# Patient Record
Sex: FEMALE | Race: ASIAN | NOT HISPANIC OR LATINO | Employment: OTHER | URBAN - METROPOLITAN AREA
[De-identification: names, ages, dates, MRNs, and addresses within clinical notes are randomized per-mention and may not be internally consistent; named-entity substitution may affect disease eponyms.]

---

## 2020-01-17 ENCOUNTER — OFFICE VISIT (OUTPATIENT)
Dept: MEDICAL GROUP | Facility: PHYSICIAN GROUP | Age: 70
End: 2020-01-17
Payer: MEDICARE

## 2020-01-17 VITALS
BODY MASS INDEX: 26.58 KG/M2 | DIASTOLIC BLOOD PRESSURE: 92 MMHG | TEMPERATURE: 97.6 F | WEIGHT: 150 LBS | OXYGEN SATURATION: 94 % | HEIGHT: 63 IN | SYSTOLIC BLOOD PRESSURE: 164 MMHG | HEART RATE: 67 BPM

## 2020-01-17 DIAGNOSIS — Z76.89 ENCOUNTER TO ESTABLISH CARE: ICD-10-CM

## 2020-01-17 DIAGNOSIS — R73.09 ELEVATED HEMOGLOBIN A1C: ICD-10-CM

## 2020-01-17 DIAGNOSIS — H53.9 VISION ABNORMALITIES: ICD-10-CM

## 2020-01-17 DIAGNOSIS — Z23 NEED FOR VACCINATION: ICD-10-CM

## 2020-01-17 DIAGNOSIS — E55.9 VITAMIN D DEFICIENCY: ICD-10-CM

## 2020-01-17 DIAGNOSIS — R03.0 ELEVATED BP WITHOUT DIAGNOSIS OF HYPERTENSION: ICD-10-CM

## 2020-01-17 DIAGNOSIS — Z13.29 SCREENING FOR HYPOTHYROIDISM: ICD-10-CM

## 2020-01-17 DIAGNOSIS — J45.20 INTERMITTENT ASTHMA WITHOUT COMPLICATION, UNSPECIFIED ASTHMA SEVERITY: ICD-10-CM

## 2020-01-17 DIAGNOSIS — E78.2 MIXED HYPERLIPIDEMIA: ICD-10-CM

## 2020-01-17 PROCEDURE — 90662 IIV NO PRSV INCREASED AG IM: CPT | Performed by: NURSE PRACTITIONER

## 2020-01-17 PROCEDURE — 99204 OFFICE O/P NEW MOD 45 MIN: CPT | Mod: 25 | Performed by: NURSE PRACTITIONER

## 2020-01-17 PROCEDURE — G0008 ADMIN INFLUENZA VIRUS VAC: HCPCS | Performed by: NURSE PRACTITIONER

## 2020-01-17 RX ORDER — ALBUTEROL SULFATE 90 UG/1
AEROSOL, METERED RESPIRATORY (INHALATION)
COMMUNITY
Start: 2018-11-28 | End: 2020-07-17 | Stop reason: SDUPTHER

## 2020-01-17 RX ORDER — MONTELUKAST SODIUM 10 MG/1
TABLET ORAL
COMMUNITY
Start: 2017-06-13 | End: 2020-01-17 | Stop reason: SDUPTHER

## 2020-01-17 RX ORDER — MONTELUKAST SODIUM 10 MG/1
10 TABLET ORAL DAILY
Qty: 90 TAB | Refills: 1 | Status: SHIPPED | OUTPATIENT
Start: 2020-01-17 | End: 2020-06-30

## 2020-01-17 ASSESSMENT — PATIENT HEALTH QUESTIONNAIRE - PHQ9: CLINICAL INTERPRETATION OF PHQ2 SCORE: 0

## 2020-01-17 NOTE — PROGRESS NOTES
Chief Complaint   Patient presents with   • Establish Care     Establish care.       HISTORY OF PRESENT ILLNESS: Patient is a 70 y.o. female, new  patient who presents today to discuss medical problems as listed below:    Health Maintenance:  COMPLETED    Vision abnormalities  New to me.  New problem.  Patient reports cloudy and mild burning in both eyes x 2 months.  She denies pain, halos, floaters, no decrease in vision, no headaches.  No blurred vision, sensitivity to the light, no itching or discharge. She wears glasses, no recent evaluation with ophthalmologist.  She states her allergies have been exacerbated since she moved to [facility] in August since August of 2019.  She admits to runny eyes, she uses over-the-counter eyedrops for dry eye which have not been helping much.     Elevated BP without diagnosis of hypertension  New to me.  BP today in the office is 164/92.  Patient reports being a little anxious with new provider in the medical office, in addition to the use of her inhaler in the last week.  Patient states her blood pressure is usually in 130s over 80s and no prior diagnosis of hypertension.  She denies headaches, dizziness, chest pain, dyspnea, cardiac palpitations, swelling in lower extremities, no nausea.    Intermittent asthma without complication  New to me.  Chronic problem, initially diagnosed at the age of 14.  States well-controlled on medications, albuterol inhaler and Advair, also taking montelukast daily for allergy control.  She would like a refill on montelukast 10 Advair today.  Patient states in the last week increase the use of albuterol due to allergy symptom exacerbation, runny nose, and intermittent shortness of breath, states triggers are cold weather.  Patient moved to [facility] to [facility] 4 months ago, this is her first winter here.  She denies cough, wheezing, CP, dyspnea, palpitations, headaches or dizziness.  Her BP today is high today  164/82 her pulses 67, pulse ox is 84%.  Recent spirometry testing done the last year per patient.      Patient Active Problem List    Diagnosis Date Noted   • Elevated BP without diagnosis of hypertension 2020   • Encounter to establish care 2020   • Intermittent asthma without complication 2020   • Vision abnormalities 2020        Allergies: Patient has no known allergies.    Current Outpatient Medications   Medication Sig Dispense Refill   • albuterol (VENTOLIN HFA) 108 (90 Base) MCG/ACT Aero Soln inhalation aerosol Use 2 puffs by inhalation 4 times a day Rinse mouthpiece at least weekly with warm water. SHAKE WELL BEFORE USING(Wait 1 minute between inhalations).Use albuterol inhaler first,wait 10 minutes before using anti-inflammatory (like Qvar or FLovent)     • montelukast (SINGULAIR) 10 MG Tab Take 1 Tab by mouth every day. 90 Tab 1   • fluticasone-salmeterol (ADVAIR DISKUS) 250-50 MCG/DOSE AEROSOL POWDER, BREATH ACTIVATED Inhale 1 Puff by mouth every 12 hours. 1 Inhaler 3     No current facility-administered medications for this visit.        Social History     Tobacco Use   • Smoking status: Former Smoker     Packs/day: 0.25     Years: 8.00     Pack years: 2.00     Last attempt to quit: 1981     Years since quittin.0   • Smokeless tobacco: Never Used   Substance Use Topics   • Alcohol use: Yes     Comment: occasionally   • Drug use: Not on file     Social History     Patient does not qualify to have social determinant information on file (likely too young).   Social History Narrative   • Not on file       Family History   Problem Relation Age of Onset   • Cancer Father 62        pancrease ca       Allergies, past medical history, past surgical history, family history, social history reviewed and updated.    Review of Systems:      - Constitutional: Negative for fever, chills, unexpected weight change, and fatigue/generalized weakness.     - HEENT: Eyes feel cloudy with mild  "burning, runny eyes.  Negative for headaches, hearing changes, ear pain, ear discharge, rhinorrhea, sinus congestion, sore throat, and neck pain.      - Respiratory: Negative for cough, sputum production, chest congestion, dyspnea, wheezing, and crackles.      - Cardiovascular: Negative for chest pain, palpitations, orthopnea, and bilateral lower extremity edema.     - Gastrointestinal:Negative for heartburn, nausea, vomiting, abdominal pain, hematochezia, melena, diarrhea, constipation, and greasy/foul-smelling stools.     - Genitourinary: Negative for dysuria, polyuria, hematuria, pyuria, urinary urgency, and urinary incontinence.    - Musculoskeletal: Negative for myalgias, back pain, and joint pain.     - Skin: Negative for rash, itching, cyanotic skin color change.     - Neurological: Negative for dizziness, tingling, tremors, focal sensory deficit, focal weakness and headaches.     - Endo/Heme/Allergies: Does not bruise/bleed easily.     - Psychiatric/Behavioral: Negative for depression, suicidal/homicidal ideation and memory loss.      All other systems reviewed and are negative    Exam:    BP (!) 164/92   Pulse 67   Temp 36.4 °C (97.6 °F)   Ht 1.6 m (5' 3\")   Wt 68 kg (150 lb)   SpO2 94%   BMI 26.57 kg/m²  Body mass index is 26.57 kg/m².    Physical Exam:  Constitutional: Well-developed and well-nourished. Not diaphoretic. No distress.   Skin: Skin is warm and dry. No rash noted.  Head: Atraumatic without lesions.  Eyes: Conjunctivae with mild erythema.  Extraocular motions are normal. Pupils are equal, round, and reactive to light. No scleral icterus.   Ears:  External ears unremarkable. Tympanic membranes clear and intact.  Nose: Nares patent. Septum midline. Turbinates without erythema nor edema. No discharge.   Mouth/Throat: Dentition is normal. Tongue normal. Oropharynx is clear and moist. Posterior pharynx without erythema or exudates.  Neck: Supple, trachea midline. Normal range of motion. No " thyromegaly present. No lymphadenopathy--cervical or supraclavicular.  Cardiovascular: Regular rate and rhythm, S1 and S2 without murmur, rubs, or gallops.    Chest: Effort normal. Clear to auscultation throughout. No adventitious sounds. No CVA tenderness.  Abdomen: Soft, non tender, and without distention. Active bowel sounds in all four quadrants. No rebound, guarding, masses or HSM.  : Negative for dysuria, polyuria, hematuria, pyuria, urinary urgency, and urinary incontinence.  Extremities: No cyanosis, clubbing, erythema, nor edema. Distal pulses intact and symmetric.   Musculoskeletal: All major joints AROM full in all directions without pain.  Neurological: Alert and oriented x 3. DTRs 2+/3 and symmetric. No cranial nerve deficit. 5/5 myotomes. Sensation intact. Negative Rhomberg.  Psychiatric:  Behavior, mood, and affect are appropriate.    LABS: 6/25/19  results reviewed and discussed with the patient, questions answered.    Assessment/Plan:  1. Elevated BP without diagnosis of hypertension  First occurrence.  Discussed etiology of high blood pressure.  Possibly associated with whitecoat syndrome and the use of steroid inhaler..  Patient given BP log to start monitoring consistent BP twice daily in a.m. and p.m., obtain BP cuff of the upper arm (no wrist device).  Will evaluate BP next visit.  If experiencing persistent headaches, chest pain, shortness of breath, palpitations, dizziness go to ED.  - CBC WITH DIFFERENTIAL; Future  - Comp Metabolic Panel; Future    2. Encounter to establish care    3. Intermittent asthma without complication, unspecified asthma severity  Stable.  Continue to use current medications, refills given.  - montelukast (SINGULAIR) 10 MG Tab; Take 1 Tab by mouth every day.  Dispense: 90 Tab; Refill: 1  - fluticasone-salmeterol (ADVAIR DISKUS) 250-50 MCG/DOSE AEROSOL POWDER, BREATH ACTIVATED; Inhale 1 Puff by mouth every 12 hours.  Dispense: 1 Inhaler; Refill: 3  - PULMONARY  FUNCTION TESTS -Test requested: Complete Pulmonary Function Test; Future    4. Vision abnormalities  Uncontrolled.  Patient to follow-up with ophthalmology.  If symptoms worsen, experience pain or burning in the eyes, changes in vision, halos around light go to ED.  - REFERRAL TO OPHTHALMOLOGY    5. Elevated hemoglobin A1c  - Comp Metabolic Panel; Future  - HEMOGLOBIN A1C; Future    6. Vitamin D deficiency  - VITAMIN D,25 HYDROXY; Future    7. Screening for hypothyroidism  - FREE THYROXINE; Future  - TSH; Future    8. Mixed hyperlipidemia  - Lipid Profile; Future    9. Need for vaccination  - Influenza Vaccine, High Dose (65+ Only)    Discussed with patient possible alternative diagnoses, pt is to take all medications as prescribed. If symptoms persist FU w/PCP, if symptoms worsen go to emergency room. If experiencing any side effects from prescribed medications reports to the office immediately or go to emergency room.  Reviewed indication, dosage, usage and potential adverse effects of prescribed medications. Reviewed risks and benefits of treatment plan. Patient verbalizes understanding of all instruction and verbally agrees to plan.    Return in about 1 week (around 1/24/2020).

## 2020-01-17 NOTE — ASSESSMENT & PLAN NOTE
New to me.  Chronic problem, initially diagnosed at the age of 14.  States well-controlled on medications, albuterol inhaler and Advair, also taking montelukast daily for allergy control.  She would like a refill on montelukast 10 Advair today.  Patient states in the last week increase the use of albuterol due to allergy symptom exacerbation, runny nose, and intermittent shortness of breath, states triggers are cold weather.  Patient moved to [facility] to [facility] 4 months ago, this is her first winter here.  She denies cough, wheezing, CP, dyspnea, palpitations, headaches or dizziness.  Her BP today is high today 164/82 her pulses 67, pulse ox is 84%.  Recent spirometry testing done the last year per patient.

## 2020-01-17 NOTE — ASSESSMENT & PLAN NOTE
New to me.  BP today in the office is 164/92.  Patient reports being a little anxious with new provider in the medical office, in addition to the use of her inhaler in the last week.  Patient states her blood pressure is usually in 130s over 80s and no prior diagnosis of hypertension.  She denies headaches, dizziness, chest pain, dyspnea, cardiac palpitations, swelling in lower extremities, no nausea.

## 2020-01-17 NOTE — ASSESSMENT & PLAN NOTE
New to me.  New problem.  Patient reports cloudy and mild burning in both eyes x 2 months.  She denies pain, halos, floaters, no decrease in vision, no headaches.  No blurred vision, sensitivity to the light, no itching or discharge. She wears glasses, no recent evaluation with ophthalmologist.  She states her allergies have been exacerbated since she moved to [facility] from [facility] in August since August of 2019.  She admits to runny eyes, she uses over-the-counter eyedrops for dry eye which have not been helping much.

## 2020-01-20 ENCOUNTER — HOSPITAL ENCOUNTER (OUTPATIENT)
Dept: LAB | Facility: MEDICAL CENTER | Age: 70
End: 2020-01-20
Attending: NURSE PRACTITIONER
Payer: MEDICARE

## 2020-01-20 DIAGNOSIS — Z13.29 SCREENING FOR HYPOTHYROIDISM: ICD-10-CM

## 2020-01-20 DIAGNOSIS — R73.09 ELEVATED HEMOGLOBIN A1C: ICD-10-CM

## 2020-01-20 DIAGNOSIS — E78.2 MIXED HYPERLIPIDEMIA: ICD-10-CM

## 2020-01-20 DIAGNOSIS — R03.0 ELEVATED BP WITHOUT DIAGNOSIS OF HYPERTENSION: ICD-10-CM

## 2020-01-20 DIAGNOSIS — E55.9 VITAMIN D DEFICIENCY: ICD-10-CM

## 2020-01-20 LAB
25(OH)D3 SERPL-MCNC: 21 NG/ML (ref 30–100)
ALBUMIN SERPL BCP-MCNC: 4.6 G/DL (ref 3.2–4.9)
ALBUMIN/GLOB SERPL: 1.3 G/DL
ALP SERPL-CCNC: 92 U/L (ref 30–99)
ALT SERPL-CCNC: 25 U/L (ref 2–50)
ANION GAP SERPL CALC-SCNC: 11 MMOL/L (ref 0–11.9)
AST SERPL-CCNC: 35 U/L (ref 12–45)
BASOPHILS # BLD AUTO: 0.6 % (ref 0–1.8)
BASOPHILS # BLD: 0.03 K/UL (ref 0–0.12)
BILIRUB SERPL-MCNC: 0.6 MG/DL (ref 0.1–1.5)
BUN SERPL-MCNC: 14 MG/DL (ref 8–22)
CALCIUM SERPL-MCNC: 10.1 MG/DL (ref 8.5–10.5)
CHLORIDE SERPL-SCNC: 104 MMOL/L (ref 96–112)
CHOLEST SERPL-MCNC: 276 MG/DL (ref 100–199)
CO2 SERPL-SCNC: 26 MMOL/L (ref 20–33)
CREAT SERPL-MCNC: 0.55 MG/DL (ref 0.5–1.4)
EOSINOPHIL # BLD AUTO: 0.26 K/UL (ref 0–0.51)
EOSINOPHIL NFR BLD: 5.3 % (ref 0–6.9)
ERYTHROCYTE [DISTWIDTH] IN BLOOD BY AUTOMATED COUNT: 49.4 FL (ref 35.9–50)
EST. AVERAGE GLUCOSE BLD GHB EST-MCNC: 111 MG/DL
GLOBULIN SER CALC-MCNC: 3.5 G/DL (ref 1.9–3.5)
GLUCOSE SERPL-MCNC: 97 MG/DL (ref 65–99)
HBA1C MFR BLD: 5.5 % (ref 0–5.6)
HCT VFR BLD AUTO: 49.3 % (ref 37–47)
HDLC SERPL-MCNC: 81 MG/DL
HGB BLD-MCNC: 15.5 G/DL (ref 12–16)
IMM GRANULOCYTES # BLD AUTO: 0.02 K/UL (ref 0–0.11)
IMM GRANULOCYTES NFR BLD AUTO: 0.4 % (ref 0–0.9)
LDLC SERPL CALC-MCNC: 127 MG/DL
LYMPHOCYTES # BLD AUTO: 1.36 K/UL (ref 1–4.8)
LYMPHOCYTES NFR BLD: 27.6 % (ref 22–41)
MCH RBC QN AUTO: 33 PG (ref 27–33)
MCHC RBC AUTO-ENTMCNC: 31.4 G/DL (ref 33.6–35)
MCV RBC AUTO: 104.9 FL (ref 81.4–97.8)
MONOCYTES # BLD AUTO: 0.49 K/UL (ref 0–0.85)
MONOCYTES NFR BLD AUTO: 9.9 % (ref 0–13.4)
NEUTROPHILS # BLD AUTO: 2.77 K/UL (ref 2–7.15)
NEUTROPHILS NFR BLD: 56.2 % (ref 44–72)
NRBC # BLD AUTO: 0 K/UL
NRBC BLD-RTO: 0 /100 WBC
PLATELET # BLD AUTO: 251 K/UL (ref 164–446)
PMV BLD AUTO: 9.9 FL (ref 9–12.9)
POTASSIUM SERPL-SCNC: 4.2 MMOL/L (ref 3.6–5.5)
PROT SERPL-MCNC: 8.1 G/DL (ref 6–8.2)
RBC # BLD AUTO: 4.7 M/UL (ref 4.2–5.4)
SODIUM SERPL-SCNC: 141 MMOL/L (ref 135–145)
T4 FREE SERPL-MCNC: 0.82 NG/DL (ref 0.53–1.43)
TRIGL SERPL-MCNC: 342 MG/DL (ref 0–149)
TSH SERPL DL<=0.005 MIU/L-ACNC: 2.15 UIU/ML (ref 0.38–5.33)
WBC # BLD AUTO: 4.9 K/UL (ref 4.8–10.8)

## 2020-01-20 PROCEDURE — 83036 HEMOGLOBIN GLYCOSYLATED A1C: CPT | Mod: GA

## 2020-01-20 PROCEDURE — 84443 ASSAY THYROID STIM HORMONE: CPT

## 2020-01-20 PROCEDURE — 36415 COLL VENOUS BLD VENIPUNCTURE: CPT | Mod: GA

## 2020-01-20 PROCEDURE — 80053 COMPREHEN METABOLIC PANEL: CPT

## 2020-01-20 PROCEDURE — 80061 LIPID PANEL: CPT

## 2020-01-20 PROCEDURE — 84439 ASSAY OF FREE THYROXINE: CPT

## 2020-01-20 PROCEDURE — 82306 VITAMIN D 25 HYDROXY: CPT

## 2020-01-20 PROCEDURE — 85025 COMPLETE CBC W/AUTO DIFF WBC: CPT

## 2020-01-24 ENCOUNTER — OFFICE VISIT (OUTPATIENT)
Dept: MEDICAL GROUP | Facility: PHYSICIAN GROUP | Age: 70
End: 2020-01-24
Payer: MEDICARE

## 2020-01-24 VITALS
BODY MASS INDEX: 26.4 KG/M2 | HEART RATE: 78 BPM | TEMPERATURE: 97.5 F | OXYGEN SATURATION: 94 % | RESPIRATION RATE: 16 BRPM | HEIGHT: 63 IN | DIASTOLIC BLOOD PRESSURE: 70 MMHG | WEIGHT: 149 LBS | SYSTOLIC BLOOD PRESSURE: 152 MMHG

## 2020-01-24 DIAGNOSIS — I15.9 SECONDARY HYPERTENSION: ICD-10-CM

## 2020-01-24 DIAGNOSIS — E55.9 VITAMIN D DEFICIENCY: ICD-10-CM

## 2020-01-24 DIAGNOSIS — R03.0 ELEVATED BP WITHOUT DIAGNOSIS OF HYPERTENSION: ICD-10-CM

## 2020-01-24 DIAGNOSIS — E78.2 MIXED HYPERLIPIDEMIA: ICD-10-CM

## 2020-01-24 PROCEDURE — 99214 OFFICE O/P EST MOD 30 MIN: CPT | Performed by: NURSE PRACTITIONER

## 2020-01-24 RX ORDER — LISINOPRIL 5 MG/1
5 TABLET ORAL DAILY
Qty: 30 TAB | Refills: 1 | Status: SHIPPED | OUTPATIENT
Start: 2020-01-24 | End: 2020-02-21

## 2020-01-24 RX ORDER — ROSUVASTATIN CALCIUM 10 MG/1
10 TABLET, COATED ORAL EVERY EVENING
Qty: 30 TAB | Refills: 2 | Status: SHIPPED | OUTPATIENT
Start: 2020-01-24 | End: 2020-02-21 | Stop reason: SDUPTHER

## 2020-01-24 RX ORDER — MONTELUKAST SODIUM 10 MG/1
TABLET ORAL
COMMUNITY
Start: 2020-01-17 | End: 2020-07-31

## 2020-01-24 RX ORDER — ERGOCALCIFEROL 1.25 MG/1
50000 CAPSULE ORAL
Qty: 12 CAP | Refills: 1 | Status: SHIPPED | OUTPATIENT
Start: 2020-01-24 | End: 2020-06-23

## 2020-01-25 NOTE — ASSESSMENT & PLAN NOTE
New diagnosis.  BP today is 152/70, previous value was 164/92.  Patient brought BP log and consistent data for the last 5 days, with a baseline of 140s and 150s over 70s and 80s.  Patient denies headaches, dizziness, CP, palpitations, dyspnea, lower extremity swelling.

## 2020-01-25 NOTE — ASSESSMENT & PLAN NOTE
Patient denies CP, dyspnea, palpitations, lower extremity swelling.  No abdominal discomfort abdominal distention.   Ref. Range 1/20/2020 10:04   Cholesterol,Tot Latest Ref Range: 100 - 199 mg/dL 276 (H)   Triglycerides Latest Ref Range: 0 - 149 mg/dL 342 (H)   HDL Latest Ref Range: >=40 mg/dL 81   LDL Latest Ref Range: <100 mg/dL 127 (H)   The 10-year ASCVD risk score (Dansvilledemond DURANT Jr., et al., 2013) is: 13.3%    Values used to calculate the score:      Age: 70 years      Sex: Female      Is Non- : No      Diabetic: No      Tobacco smoker: No      Systolic Blood Pressure: 152 mmHg      Is BP treated: No      HDL Cholesterol: 81 mg/dL      Total Cholesterol: 276 mg/dL

## 2020-01-25 NOTE — PROGRESS NOTES
Chief Complaint   Patient presents with   • Follow-Up     BP & test results       HISTORY OF PRESENT ILLNESS: Patient is a 70 y.o. female, established patient who presents today to discuss medical problems as listed below:    Mixed hyperlipidemia  Patient denies CP, dyspnea, palpitations, lower extremity swelling.  No abdominal discomfort abdominal distention.   Ref. Range 1/20/2020 10:04   Cholesterol,Tot Latest Ref Range: 100 - 199 mg/dL 276 (H)   Triglycerides Latest Ref Range: 0 - 149 mg/dL 342 (H)   HDL Latest Ref Range: >=40 mg/dL 81   LDL Latest Ref Range: <100 mg/dL 127 (H)   The 10-year ASCVD risk score (Jairon DURANT Jr., et al., 2013) is: 13.3%    Values used to calculate the score:      Age: 70 years      Sex: Female      Is Non- : No      Diabetic: No      Tobacco smoker: No      Systolic Blood Pressure: 152 mmHg      Is BP treated: No      HDL Cholesterol: 81 mg/dL      Total Cholesterol: 276 mg/dL    Vitamin D deficiency  Vitamin D is 21, patient is on a supplement.      Patient Active Problem List    Diagnosis Date Noted   • Mixed hyperlipidemia 01/24/2020   • Vitamin D deficiency 01/24/2020   • Encounter to establish care 01/17/2020   • Intermittent asthma without complication 01/17/2020   • Vision abnormalities 01/17/2020        Allergies: Patient has no known allergies.    Current Outpatient Medications   Medication Sig Dispense Refill   • WIXELA INHUB 250-50 MCG/DOSE AEROSOL POWDER, BREATH ACTIVATED      • vitamin D, Ergocalciferol, (DRISDOL) 37730 units Cap capsule Take 1 Cap by mouth every 7 days. 12 Cap 1   • rosuvastatin (CRESTOR) 10 MG Tab Take 1 Tab by mouth every evening. 30 Tab 2   • lisinopril (PRINIVIL) 5 MG Tab Take 1 Tab by mouth every day. 30 Tab 1   • albuterol (VENTOLIN HFA) 108 (90 Base) MCG/ACT Aero Soln inhalation aerosol Use 2 puffs by inhalation 4 times a day Rinse mouthpiece at least weekly with warm water. SHAKE WELL BEFORE USING(Wait 1 minute between  "inhalations).Use albuterol inhaler first,wait 10 minutes before using anti-inflammatory (like Qvar or FLovent)     • montelukast (SINGULAIR) 10 MG Tab Take 1 Tab by mouth every day. 90 Tab 1   • fluticasone-salmeterol (ADVAIR DISKUS) 250-50 MCG/DOSE AEROSOL POWDER, BREATH ACTIVATED Inhale 1 Puff by mouth every 12 hours. 1 Inhaler 3   • montelukast (SINGULAIR) 10 MG Tab        No current facility-administered medications for this visit.        Social History     Tobacco Use   • Smoking status: Former Smoker     Packs/day: 0.25     Years: 8.00     Pack years: 2.00     Last attempt to quit: 1981     Years since quittin.0   • Smokeless tobacco: Never Used   Substance Use Topics   • Alcohol use: Yes     Comment: occasionally   • Drug use: Not on file     Social History     Patient does not qualify to have social determinant information on file (likely too young).   Social History Narrative   • Not on file       Family History   Problem Relation Age of Onset   • Cancer Father 62        pancrease ca       Allergies, past medical history, past surgical history, family history, social history reviewed and updated.    Review of Systems:      - Constitutional: Negative for fever, chills, unexpected weight change, and fatigue/generalized weakness.    - Respiratory: Negative for cough, sputum production, chest congestion, dyspnea, wheezing, and crackles.      - Cardiovascular: Negative for chest pain, palpitations, orthopnea, and bilateral lower extremity edema.     - Psychiatric/Behavioral: Negative for depression, suicidal/homicidal ideation and memory loss.      All other systems reviewed and are negative    Exam:    /70 (BP Location: Right arm, Patient Position: Sitting, BP Cuff Size: Adult)   Pulse 78   Temp 36.4 °C (97.5 °F) (Temporal)   Resp 16   Ht 1.6 m (5' 3\")   Wt 67.6 kg (149 lb)   SpO2 94%   BMI 26.39 kg/m²  Body mass index is 26.39 kg/m².    Physical Exam:  Constitutional: Well-developed and " well-nourished. Not diaphoretic. No distress.   Cardiovascular: Regular rate and rhythm, S1 and S2 without murmur, rubs, or gallops.    Chest: Effort normal. Clear to auscultation throughout. No adventitious sounds.   Neurological: Alert and oriented x 3.   Psychiatric:  Behavior, mood, and affect are appropriate.    LABS: 1/20/19  results reviewed and discussed with the patient, questions answered.    Patient was seen for 25 minutes face to face of which > 50% of appointment time was spent on counseling and coordination of care regarding the above.     Assessment/Plan:  1. Vitamin D deficiency  - vitamin D, Ergocalciferol, (DRISDOL) 27097 units Cap capsule; Take 1 Cap by mouth every 7 days.  Dispense: 12 Cap; Refill: 1    2. Mixed hyperlipidemia  Uncontrolled.  Discussed etiology and risk factors associated with high cholesterol.  Discussed healthy lifestyle such as exercise, healthy nutrition, avoiding sweet and fried foods.  Okay to take OTC fish oil if able to tolerate given history of cholecystectomy.  Encourage stress control.  Discussed possible risk factors associated with statins.  Advised to increase daily fiber intake, nutritional choices discussed.  - rosuvastatin (CRESTOR) 10 MG Tab; Take 1 Tab by mouth every evening.  Dispense: 30 Tab; Refill: 2  - Lipid Profile; Future  - Comp Metabolic Panel; Future    3. Elevated BP without diagnosis of hypertension  Uncontrolled.  Discussed the etiology and risk factors associated with of uncontrolled BP and uncontrolled cholesterol.  Patient started on lisinopril 5 mg daily.  Discussed possible side effects of the medication such as cough, lower extremity swelling, angioedema.  Stop taking if experiencing side effects and contact the office.  - lisinopril (PRINIVIL) 5 MG Tab; Take 1 Tab by mouth every day.  Dispense: 30 Tab; Refill: 1    Discussed with patient possible alternative diagnoses, pt is to take all medications as prescribed. If symptoms persist FU  w/PCP, if symptoms worsen go to emergency room. If experiencing any side effects from prescribed medications reports to the office immediately or go to emergency room.  Reviewed indication, dosage, usage and potential adverse effects of prescribed medications. Reviewed risks and benefits of treatment plan. Patient verbalizes understanding of all instruction and verbally agrees to plan.    Return if symptoms worsen or fail to improve.

## 2020-02-21 ENCOUNTER — OFFICE VISIT (OUTPATIENT)
Dept: MEDICAL GROUP | Facility: PHYSICIAN GROUP | Age: 70
End: 2020-02-21
Payer: MEDICARE

## 2020-02-21 VITALS
HEART RATE: 97 BPM | HEIGHT: 63 IN | WEIGHT: 150.7 LBS | OXYGEN SATURATION: 95 % | TEMPERATURE: 97.8 F | DIASTOLIC BLOOD PRESSURE: 70 MMHG | RESPIRATION RATE: 16 BRPM | BODY MASS INDEX: 26.7 KG/M2 | SYSTOLIC BLOOD PRESSURE: 130 MMHG

## 2020-02-21 DIAGNOSIS — I10 ESSENTIAL HYPERTENSION: ICD-10-CM

## 2020-02-21 DIAGNOSIS — E78.2 MIXED HYPERLIPIDEMIA: ICD-10-CM

## 2020-02-21 PROCEDURE — 99213 OFFICE O/P EST LOW 20 MIN: CPT | Performed by: NURSE PRACTITIONER

## 2020-02-21 RX ORDER — CHLORAL HYDRATE 500 MG
1000 CAPSULE ORAL 2 TIMES DAILY
COMMUNITY

## 2020-02-21 RX ORDER — OLMESARTAN MEDOXOMIL 5 MG/1
5 TABLET ORAL DAILY
Qty: 60 TAB | Refills: 1 | Status: SHIPPED | OUTPATIENT
Start: 2020-02-21 | End: 2020-05-28

## 2020-02-21 RX ORDER — ROSUVASTATIN CALCIUM 10 MG/1
10 TABLET, COATED ORAL EVERY EVENING
Qty: 60 TAB | Refills: 1 | Status: SHIPPED | OUTPATIENT
Start: 2020-02-21 | End: 2020-05-28

## 2020-02-21 NOTE — PROGRESS NOTES
Chief Complaint   Patient presents with   • Hypertension Follow-up     pt states that the lisinopril makes her cough       HISTORY OF PRESENT ILLNESS: Patient is a 70 y.o. female, established patient who presents today to discuss medical problems as listed below:    Essential hypertension  Known issue.  Patient started on lisinopril 5 mg last visit.  Today patient reports cough with no associated symptoms of URI, most likely from initiation of lisinopril.  Last dose was this morning.  Patient BP today is 130/70.  She denies CP, dyspnea, palpitations, headaches, dizziness.  Patient forgot to bring BP log today.    Mixed hyperlipidemia  Patient is taking rosuvastatin 10 mg, started last visit.  She denies side effects, no muscle pain, no GI issues.  She would like a refill today.  She would be leaving town for a month and a half and would like a refill until then.      Patient Active Problem List    Diagnosis Date Noted   • Essential hypertension 02/21/2020   • Mixed hyperlipidemia 01/24/2020   • Vitamin D deficiency 01/24/2020   • Encounter to establish care 01/17/2020   • Intermittent asthma without complication 01/17/2020   • Vision abnormalities 01/17/2020        Allergies: Lisinopril    Current Outpatient Medications   Medication Sig Dispense Refill   • Omega-3 Fatty Acids (FISH OIL) 1000 MG Cap capsule Take 1,000 mg by mouth 2 Times a Day.     • olmesartan (BENICAR) 5 MG tablet Take 1 Tab by mouth every day. 60 Tab 1   • rosuvastatin (CRESTOR) 10 MG Tab Take 1 Tab by mouth every evening. 60 Tab 1   • WIXELA INHUB 250-50 MCG/DOSE AEROSOL POWDER, BREATH ACTIVATED      • vitamin D, Ergocalciferol, (DRISDOL) 28920 units Cap capsule Take 1 Cap by mouth every 7 days. 12 Cap 1   • albuterol (VENTOLIN HFA) 108 (90 Base) MCG/ACT Aero Soln inhalation aerosol Use 2 puffs by inhalation 4 times a day Rinse mouthpiece at least weekly with warm water. SHAKE WELL BEFORE USING(Wait 1 minute between inhalations).Use albuterol  "inhaler first,wait 10 minutes before using anti-inflammatory (like Qvar or FLovent)     • montelukast (SINGULAIR) 10 MG Tab Take 1 Tab by mouth every day. 90 Tab 1   • fluticasone-salmeterol (ADVAIR DISKUS) 250-50 MCG/DOSE AEROSOL POWDER, BREATH ACTIVATED Inhale 1 Puff by mouth every 12 hours. 1 Inhaler 3   • montelukast (SINGULAIR) 10 MG Tab        No current facility-administered medications for this visit.        Social History     Tobacco Use   • Smoking status: Former Smoker     Packs/day: 0.25     Years: 8.00     Pack years: 2.00     Last attempt to quit: 1981     Years since quittin.1   • Smokeless tobacco: Never Used   Substance Use Topics   • Alcohol use: Yes     Comment: occasionally   • Drug use: Not on file     Social History     Social History Narrative   • Not on file       Family History   Problem Relation Age of Onset   • Cancer Father 62        pancrease ca       Allergies, past medical history, past surgical history, family history, social history reviewed and updated.    Review of Systems:     - Constitutional: Negative for fever, chills, unexpected weight change, and fatigue/generalized weakness.     - Respiratory: Negative for cough, sputum production, chest congestion, dyspnea, wheezing, and crackles.      - Cardiovascular: Negative for chest pain, palpitations, orthopnea, and bilateral lower extremity edema.     - Psychiatric/Behavioral: Negative for depression, suicidal/homicidal ideation and memory loss.      All other systems reviewed and are negative    Exam:    /70 (BP Location: Right arm, Patient Position: Sitting, BP Cuff Size: Adult)   Pulse 97   Temp 36.6 °C (97.8 °F) (Temporal)   Resp 16   Ht 1.6 m (5' 3\")   Wt 68.4 kg (150 lb 11.2 oz)   SpO2 95%   BMI 26.70 kg/m²  Body mass index is 26.7 kg/m².    Physical Exam:  Constitutional: Well-developed and well-nourished. Not diaphoretic. No distress.   Cardiovascular: Regular rate and rhythm, S1 and S2 without murmur, " rubs, or gallops.    Chest: Effort normal. Clear to auscultation throughout. No adventitious sounds.   Neurological: Alert and oriented x 3.   Psychiatric:  Behavior, mood, and affect are appropriate.    Assessment/Plan:  1. Essential hypertension  Well-controlled.  Will D/C lisinopril due to side effect of cough, lisinopril is listed under allergies.  Will start on olmesartan 5 mg daily.  Patient to start BP log and bring next visit.    2. Mixed hyperlipidemia  Continue to take current medications, refill given.  Will check lipid profile, lab orders in place, patient is aware.  Will review next visit.  Also reviewed healthy lifestyle today again.  - rosuvastatin (CRESTOR) 10 MG Tab; Take 1 Tab by mouth every evening.  Dispense: 60 Tab; Refill: 1    Discussed with patient possible alternative diagnoses, pt is to take all medications as prescribed. If symptoms persist FU w/PCP, if symptoms worsen go to emergency room. If experiencing any side effects from prescribed medications reports to the office immediately or go to emergency room.  Reviewed indication, dosage, usage and potential adverse effects of prescribed medications. Reviewed risks and benefits of treatment plan. Patient verbalizes understanding of all instruction and verbally agrees to plan.    Return if symptoms worsen or fail to improve.

## 2020-02-21 NOTE — ASSESSMENT & PLAN NOTE
Patient is taking rosuvastatin 10 mg, started last visit.  She denies side effects, no muscle pain, no GI issues.  She would like a refill today.  She would be leaving town for a month and a half and would like a refill until then.

## 2020-02-21 NOTE — ASSESSMENT & PLAN NOTE
Known issue.  Patient started on lisinopril 5 mg last visit.  Today patient reports cough with no associated symptoms of URI, most likely from initiation of lisinopril.  Last dose was this morning.  Patient BP today is 130/70.  She denies CP, dyspnea, palpitations, headaches, dizziness.  Patient forgot to bring BP log today.

## 2020-06-21 DIAGNOSIS — E55.9 VITAMIN D DEFICIENCY: ICD-10-CM

## 2020-06-23 RX ORDER — ERGOCALCIFEROL 1.25 MG/1
CAPSULE ORAL
Qty: 12 CAP | Refills: 1 | Status: SHIPPED | OUTPATIENT
Start: 2020-06-23 | End: 2020-12-15

## 2020-06-27 DIAGNOSIS — J45.20 INTERMITTENT ASTHMA WITHOUT COMPLICATION, UNSPECIFIED ASTHMA SEVERITY: ICD-10-CM

## 2020-06-29 NOTE — TELEPHONE ENCOUNTER
Received request via: Patient    Was the patient seen in the last year in this department? Yes    Does the patient have an active prescription (recently filled or refills available) for medication(s) requested? No

## 2020-06-30 RX ORDER — MONTELUKAST SODIUM 10 MG/1
TABLET ORAL
Qty: 90 TAB | Refills: 1 | Status: SHIPPED | OUTPATIENT
Start: 2020-06-30 | End: 2020-12-29

## 2020-07-01 ENCOUNTER — HOSPITAL ENCOUNTER (OUTPATIENT)
Dept: LAB | Facility: MEDICAL CENTER | Age: 70
End: 2020-07-01
Attending: NURSE PRACTITIONER
Payer: MEDICARE

## 2020-07-01 DIAGNOSIS — E78.2 MIXED HYPERLIPIDEMIA: ICD-10-CM

## 2020-07-01 LAB
ALBUMIN SERPL BCP-MCNC: 4.6 G/DL (ref 3.2–4.9)
ALBUMIN/GLOB SERPL: 1.6 G/DL
ALP SERPL-CCNC: 111 U/L (ref 30–99)
ALT SERPL-CCNC: 89 U/L (ref 2–50)
ANION GAP SERPL CALC-SCNC: 15 MMOL/L (ref 7–16)
AST SERPL-CCNC: 59 U/L (ref 12–45)
BILIRUB SERPL-MCNC: 0.5 MG/DL (ref 0.1–1.5)
BUN SERPL-MCNC: 19 MG/DL (ref 8–22)
CALCIUM SERPL-MCNC: 9.9 MG/DL (ref 8.5–10.5)
CHLORIDE SERPL-SCNC: 104 MMOL/L (ref 96–112)
CHOLEST SERPL-MCNC: 192 MG/DL (ref 100–199)
CO2 SERPL-SCNC: 22 MMOL/L (ref 20–33)
CREAT SERPL-MCNC: 0.52 MG/DL (ref 0.5–1.4)
FASTING STATUS PATIENT QL REPORTED: NORMAL
GLOBULIN SER CALC-MCNC: 2.8 G/DL (ref 1.9–3.5)
GLUCOSE SERPL-MCNC: 98 MG/DL (ref 65–99)
HDLC SERPL-MCNC: 96 MG/DL
LDLC SERPL CALC-MCNC: 69 MG/DL
POTASSIUM SERPL-SCNC: 4.1 MMOL/L (ref 3.6–5.5)
PROT SERPL-MCNC: 7.4 G/DL (ref 6–8.2)
SODIUM SERPL-SCNC: 141 MMOL/L (ref 135–145)
TRIGL SERPL-MCNC: 133 MG/DL (ref 0–149)

## 2020-07-01 PROCEDURE — 36415 COLL VENOUS BLD VENIPUNCTURE: CPT

## 2020-07-01 PROCEDURE — 80053 COMPREHEN METABOLIC PANEL: CPT

## 2020-07-01 PROCEDURE — 80061 LIPID PANEL: CPT

## 2020-07-17 ENCOUNTER — OFFICE VISIT (OUTPATIENT)
Dept: MEDICAL GROUP | Facility: PHYSICIAN GROUP | Age: 70
End: 2020-07-17
Payer: MEDICARE

## 2020-07-17 VITALS
TEMPERATURE: 98.2 F | HEART RATE: 87 BPM | HEIGHT: 62 IN | RESPIRATION RATE: 20 BRPM | DIASTOLIC BLOOD PRESSURE: 80 MMHG | OXYGEN SATURATION: 95 % | WEIGHT: 153.4 LBS | SYSTOLIC BLOOD PRESSURE: 140 MMHG | BODY MASS INDEX: 28.23 KG/M2

## 2020-07-17 DIAGNOSIS — N95.1 MENOPAUSAL STATE: ICD-10-CM

## 2020-07-17 DIAGNOSIS — L30.9 ECZEMA, UNSPECIFIED TYPE: ICD-10-CM

## 2020-07-17 DIAGNOSIS — J45.909 MILD ASTHMA WITHOUT COMPLICATION, UNSPECIFIED WHETHER PERSISTENT: ICD-10-CM

## 2020-07-17 DIAGNOSIS — R79.89 ELEVATED LFTS: ICD-10-CM

## 2020-07-17 DIAGNOSIS — Z12.11 SCREENING FOR COLORECTAL CANCER: ICD-10-CM

## 2020-07-17 DIAGNOSIS — Z12.12 SCREENING FOR COLORECTAL CANCER: ICD-10-CM

## 2020-07-17 DIAGNOSIS — Z78.0 POSTMENOPAUSAL STATUS (AGE-RELATED) (NATURAL): ICD-10-CM

## 2020-07-17 DIAGNOSIS — E78.2 MIXED HYPERLIPIDEMIA: ICD-10-CM

## 2020-07-17 PROCEDURE — 99214 OFFICE O/P EST MOD 30 MIN: CPT | Performed by: NURSE PRACTITIONER

## 2020-07-17 RX ORDER — ALBUTEROL SULFATE 90 UG/1
2 AEROSOL, METERED RESPIRATORY (INHALATION) EVERY 6 HOURS PRN
Qty: 8.5 G | Refills: 3 | Status: SHIPPED | OUTPATIENT
Start: 2020-07-17 | End: 2020-07-21

## 2020-07-17 ASSESSMENT — FIBROSIS 4 INDEX: FIB4 SCORE: 1.74

## 2020-07-17 NOTE — ASSESSMENT & PLAN NOTE
No ETOH, no NSAIDS. Started on Crestor a few mos ago. Denies nausea, abdominal discomfort, no muscle ache.   Ref. Range 7/1/2020 10:36   AST(SGOT) Latest Ref Range: 12 - 45 U/L 59 (H)   ALT(SGPT) Latest Ref Range: 2 - 50 U/L 89 (H)   Alkaline Phosphatase Latest Ref Range: 30 - 99 U/L 111 (H)

## 2020-07-17 NOTE — PROGRESS NOTES
Chief Complaint   Patient presents with   • Follow-Up     Lab Review        HISTORY OF PRESENT ILLNESS: Patient is a 70 y.o. female, established patient who presents today to discuss medical problems as listed below:    Health Maintenance:  COMPLETED     Mixed hyperlipidemia  Chol panel WNL on Crestor 10 mg. Taking fish oil and trying healthy lifestyle. Denies CP, dyspnea, dizziness.   Ref. Range 7/1/2020 10:36   Cholesterol,Tot Latest Ref Range: 100 - 199 mg/dL 192   Triglycerides Latest Ref Range: 0 - 149 mg/dL 133   HDL Latest Ref Range: >=40 mg/dL 96   LDL Latest Ref Range: <100 mg/dL 69   The 10-year ASCVD risk score (Jairon DURANT Jr., et al., 2013) is: 13.3%    Values used to calculate the score:      Age: 70 years      Sex: Female      Is Non- : No      Diabetic: No      Tobacco smoker: No      Systolic Blood Pressure: 140 mmHg      Is BP treated: Yes      HDL Cholesterol: 96 mg/dL      Total Cholesterol: 192 mg/dL    Elevated LFTs  No ETOH, no NSAIDS. Started on Crestor a few mos ago. Denies nausea, abdominal discomfort, no muscle ache.   Ref. Range 7/1/2020 10:36   AST(SGOT) Latest Ref Range: 12 - 45 U/L 59 (H)   ALT(SGPT) Latest Ref Range: 2 - 50 U/L 89 (H)   Alkaline Phosphatase Latest Ref Range: 30 - 99 U/L 111 (H)       Eczema  Chronic issue. Well controlled on Mycolog cream, requesting refills today.  No recent outbreaks.    Mild asthma without complication  Chronic problem. Well controlled. No recent exacerbations. Requesting refills today.      Patient Active Problem List    Diagnosis Date Noted   • Elevated LFTs 07/17/2020   • Eczema 07/17/2020   • Mild asthma without complication 07/17/2020   • Essential hypertension 02/21/2020   • Mixed hyperlipidemia 01/24/2020   • Vitamin D deficiency 01/24/2020   • Encounter to establish care 01/17/2020   • Intermittent asthma without complication 01/17/2020   • Vision abnormalities 01/17/2020        Allergies: Lisinopril    Current  Outpatient Medications   Medication Sig Dispense Refill   • nystatin/triamcinolone (MYCOLOG) 551587-2.1 UNIT/GM-% Cream As 1 g 5   • albuterol (VENTOLIN HFA) 108 (90 Base) MCG/ACT Aero Soln inhalation aerosol Inhale 2 Puffs by mouth every 6 hours as needed for Shortness of Breath. 8.5 g 3   • montelukast (SINGULAIR) 10 MG Tab TAKE 1 TABLET BY MOUTH EVERY DAY 90 Tab 1   • vitamin D, Ergocalciferol, (DRISDOL) 1.25 MG (28369 UT) Cap capsule TAKE 1 CAPSULE BY MOUTH EVERY 7 DAYS 12 Cap 1   • olmesartan (BENICAR) 5 MG tablet TAKE 1 TABLET BY MOUTH EVERY DAY 60 Tab 1   • rosuvastatin (CRESTOR) 10 MG Tab TAKE 1 TABLET BY MOUTH EVERY EVENING 60 Tab 0   • Omega-3 Fatty Acids (FISH OIL) 1000 MG Cap capsule Take 1,000 mg by mouth 2 Times a Day.     • WIXELA INHUB 250-50 MCG/DOSE AEROSOL POWDER, BREATH ACTIVATED      • montelukast (SINGULAIR) 10 MG Tab      • fluticasone-salmeterol (ADVAIR DISKUS) 250-50 MCG/DOSE AEROSOL POWDER, BREATH ACTIVATED Inhale 1 Puff by mouth every 12 hours. (Patient not taking: Reported on 2020) 1 Inhaler 3     No current facility-administered medications for this visit.        Social History     Tobacco Use   • Smoking status: Former Smoker     Packs/day: 0.25     Years: 8.00     Pack years: 2.00     Last attempt to quit: 1981     Years since quittin.5   • Smokeless tobacco: Never Used   Substance Use Topics   • Alcohol use: Yes     Comment: occasionally   • Drug use: Never     Social History     Social History Narrative   • Not on file       Family History   Problem Relation Age of Onset   • Cancer Father 62        pancrease ca       Allergies, past medical history, past surgical history, family history, social history reviewed and updated.    Review of Systems:     - Constitutional: Negative for fever, chills, unexpected weight change, and fatigue/generalized weakness.    - Respiratory: Negative for cough, sputum production, chest congestion, dyspnea, wheezing, and crackles.      -  "Cardiovascular: Negative for chest pain, palpitations, orthopnea, and bilateral lower extremity edema.     - Psychiatric/Behavioral: Negative for depression, suicidal/homicidal ideation and memory loss.      All other systems reviewed and are negative    Exam:    /80 (BP Location: Right arm, Patient Position: Sitting, BP Cuff Size: Adult)   Pulse 87   Temp 36.8 °C (98.2 °F) (Temporal)   Resp 20   Ht 1.575 m (5' 2\")   Wt 69.6 kg (153 lb 6.4 oz)   SpO2 95%   BMI 28.06 kg/m²  Body mass index is 28.06 kg/m².    Physical Exam:  Constitutional: Well-developed and well-nourished. Not diaphoretic. No distress.   Cardiovascular: Regular rate and rhythm, S1 and S2 without murmur, rubs, or gallops.    Chest: Effort normal. Clear to auscultation throughout. No adventitious sounds.   Neurological: Alert and oriented x 3.  Psychiatric:  Behavior, mood, and affect are appropriate.  MA/nursing note and vitals reviewed.    LABS: 7/1/20  results reviewed and discussed with the patient, questions answered.    Patient was seen for 25 minutes face to face of which > 50% of appointment time was spent on counseling and coordination of care regarding the above.     Assessment/Plan:  1. Screening for colorectal cancer  - REFERRAL TO GI FOR COLONOSCOPY    2. Postmenopausal status (age-related) (natural)  - DS-BONE DENSITY STUDY (DEXA)    3. Menopausal state  - DS-BONE DENSITY STUDY (DEXA)    4. Eczema, unspecified type  Well controlled on current regimen, refill given.  - nystatin/triamcinolone (MYCOLOG) 705479-5.1 UNIT/GM-% Cream; As  Dispense: 1 g; Refill: 5    5. Mild asthma without complication, unspecified whether persistent  Well controled on current regimen. Refills given.  - albuterol (VENTOLIN HFA) 108 (90 Base) MCG/ACT Aero Soln inhalation aerosol; Inhale 2 Puffs by mouth every 6 hours as needed for Shortness of Breath.  Dispense: 8.5 g; Refill: 3    6. Mixed hyperlipidemia  Well controlled on current regimen, however " elevated LFTs most likely d/t statin. Will hold statin for x 1 wk and restart  Half dose of 5 mg. Will recheck labs, efficacy in 3 mos. Continue healthy life style, OTC fish oil and adequate daily fiber.  - Lipid Profile; Future  - Comp Metabolic Panel; Future    7. Elevated LFTs  Elevated, most likely d/t statin use. Discussed in #6  - Comp Metabolic Panel; Future       Discussed with patient possible alternative diagnoses, pt is to take all medications as prescribed. If symptoms persist FU w/PCP, if symptoms worsen go to emergency room. If experiencing any side effects from prescribed medications reports to the office immediately or go to emergency room.  Reviewed indication, dosage, usage and potential adverse effects of prescribed medications. Reviewed risks and benefits of treatment plan. Patient verbalizes understanding of all instruction and verbally agrees to plan.    Return if symptoms worsen or fail to improve.

## 2020-07-17 NOTE — ASSESSMENT & PLAN NOTE
Chol panel WNL on Crestor 10 mg. Taking fish oil and trying healthy lifestyle. Denies CP, dyspnea, dizziness.   Ref. Range 7/1/2020 10:36   Cholesterol,Tot Latest Ref Range: 100 - 199 mg/dL 192   Triglycerides Latest Ref Range: 0 - 149 mg/dL 133   HDL Latest Ref Range: >=40 mg/dL 96   LDL Latest Ref Range: <100 mg/dL 69   The 10-year ASCVD risk score (Jairon DURANT Jr., et al., 2013) is: 13.3%    Values used to calculate the score:      Age: 70 years      Sex: Female      Is Non- : No      Diabetic: No      Tobacco smoker: No      Systolic Blood Pressure: 140 mmHg      Is BP treated: Yes      HDL Cholesterol: 96 mg/dL      Total Cholesterol: 192 mg/dL

## 2020-07-21 RX ORDER — ALBUTEROL SULFATE 90 UG/1
2 AEROSOL, METERED RESPIRATORY (INHALATION) EVERY 6 HOURS PRN
Qty: 25.5 G | Refills: 2 | Status: SHIPPED | OUTPATIENT
Start: 2020-07-21 | End: 2021-06-14

## 2020-07-31 ENCOUNTER — OFFICE VISIT (OUTPATIENT)
Dept: MEDICAL GROUP | Facility: PHYSICIAN GROUP | Age: 70
End: 2020-07-31
Payer: MEDICARE

## 2020-07-31 VITALS
HEART RATE: 80 BPM | HEIGHT: 62 IN | OXYGEN SATURATION: 96 % | RESPIRATION RATE: 16 BRPM | DIASTOLIC BLOOD PRESSURE: 80 MMHG | BODY MASS INDEX: 28.3 KG/M2 | TEMPERATURE: 98.2 F | SYSTOLIC BLOOD PRESSURE: 136 MMHG | WEIGHT: 153.8 LBS

## 2020-07-31 DIAGNOSIS — E78.2 MIXED HYPERLIPIDEMIA: ICD-10-CM

## 2020-07-31 DIAGNOSIS — I10 ESSENTIAL HYPERTENSION: ICD-10-CM

## 2020-07-31 PROCEDURE — 99214 OFFICE O/P EST MOD 30 MIN: CPT | Performed by: NURSE PRACTITIONER

## 2020-07-31 RX ORDER — OLMESARTAN MEDOXOMIL 5 MG/1
5 TABLET ORAL
Qty: 90 TAB | Refills: 1 | Status: SHIPPED | OUTPATIENT
Start: 2020-07-31 | End: 2021-02-02

## 2020-07-31 ASSESSMENT — FIBROSIS 4 INDEX: FIB4 SCORE: 1.74

## 2020-07-31 NOTE — PROGRESS NOTES
Chief Complaint   Patient presents with   • Follow-Up     hypertension       HISTORY OF PRESENT ILLNESS: Patient is a 70 y.o. female, established patient who presents today to discuss medical problems as listed below:    Health Maintenance:  COMPLETED     Essential hypertension  Chronic problem. Well controlled on Olmesartan 5 mg daily, tolerating well. BP today is 136/80. She denies CP, dyspnea, palpitations, changes in vision, peripheral edema. Her home devise did not calibrate to match the office device.    Mixed hyperlipidemia  Chronic problem.  Was started on rosuvastatin 10 mg last visit.  This significantly improved her cholesterol, however patient experienced myalgia.  She temporarily stopped medication, she continuing healthy lifestyle and taking fish oil.  She denies CP, dyspnea, dizziness, peripheral edema.  We will plan to recheck cholesterol panel and assess the need for statin.      Patient Active Problem List    Diagnosis Date Noted   • Elevated LFTs 07/17/2020   • Eczema 07/17/2020   • Mild asthma without complication 07/17/2020   • Essential hypertension 02/21/2020   • Mixed hyperlipidemia 01/24/2020   • Vitamin D deficiency 01/24/2020   • Encounter to establish care 01/17/2020   • Intermittent asthma without complication 01/17/2020   • Vision abnormalities 01/17/2020        Allergies: Lisinopril    Current Outpatient Medications   Medication Sig Dispense Refill   • olmesartan (BENICAR) 5 MG tablet Take 1 Tab by mouth every day. 90 Tab 1   • albuterol 108 (90 Base) MCG/ACT Aero Soln inhalation aerosol INHALE 2 PUFFS BY MOUTH EVERY 6 HOURS AS NEEDED FOR SHORTNESS OF BREATH 25.5 g 2   • nystatin/triamcinolone (MYCOLOG) 113640-7.1 UNIT/GM-% Cream As 1 g 5   • montelukast (SINGULAIR) 10 MG Tab TAKE 1 TABLET BY MOUTH EVERY DAY 90 Tab 1   • vitamin D, Ergocalciferol, (DRISDOL) 1.25 MG (88996 UT) Cap capsule TAKE 1 CAPSULE BY MOUTH EVERY 7 DAYS 12 Cap 1   • rosuvastatin (CRESTOR) 10 MG Tab TAKE 1 TABLET BY  "MOUTH EVERY EVENING (Patient taking differently: 5 mg.) 60 Tab 0   • Omega-3 Fatty Acids (FISH OIL) 1000 MG Cap capsule Take 1,000 mg by mouth 2 Times a Day.     • WIXELA INHUB 250-50 MCG/DOSE AEROSOL POWDER, BREATH ACTIVATED      • fluticasone-salmeterol (ADVAIR DISKUS) 250-50 MCG/DOSE AEROSOL POWDER, BREATH ACTIVATED Inhale 1 Puff by mouth every 12 hours. (Patient not taking: Reported on 2020) 1 Inhaler 3     No current facility-administered medications for this visit.        Social History     Tobacco Use   • Smoking status: Former Smoker     Packs/day: 0.25     Years: 8.00     Pack years: 2.00     Last attempt to quit: 1981     Years since quittin.5   • Smokeless tobacco: Never Used   Substance Use Topics   • Alcohol use: Yes     Comment: occasionally   • Drug use: Never     Social History     Social History Narrative   • Not on file       Family History   Problem Relation Age of Onset   • Cancer Father 62        pancrease ca       Allergies, past medical history, past surgical history, family history, social history reviewed and updated.    Review of Systems:     - Constitutional: Negative for fever, chills, unexpected weight change, and fatigue/generalized weakness.     - Respiratory: Negative for cough, sputum production, chest congestion, dyspnea, wheezing, and crackles.      - Cardiovascular: Negative for chest pain, palpitations, orthopnea, and bilateral lower extremity edema.       - Psychiatric/Behavioral: Negative for depression, suicidal/homicidal ideation and memory loss.      All other systems reviewed and are negative    Exam:    /80 (BP Location: Left arm, Patient Position: Sitting)   Pulse 80   Temp 36.8 °C (98.2 °F) (Tympanic)   Resp 16   Ht 1.575 m (5' 2\")   Wt 69.8 kg (153 lb 12.8 oz)   SpO2 96%   BMI 28.13 kg/m²  Body mass index is 28.13 kg/m².    Physical Exam:  Constitutional: Well-developed and well-nourished. Not diaphoretic. No distress.   Cardiovascular: " Regular rate and rhythm, S1 and S2 without murmur, rubs, or gallops.    Chest: Effort normal. Clear to auscultation throughout. No adventitious sounds.   Neurological: Alert and oriented x 3.   Psychiatric:  Behavior, mood, and affect are appropriate.  MA/nursing note and vitals reviewed.    LABS: 7/1/20  results reviewed and discussed with the patient, questions answered.    Patient was seen for 25 minutes face to face of which > 50% of appointment time was spent on counseling and coordination of care regarding the above.     Assessment/Plan:  1. Essential hypertension  Controlled on current regimen, continue.  Refills given.  - olmesartan (BENICAR) 5 MG tablet; Take 1 Tab by mouth every day.  Dispense: 90 Tab; Refill: 1    2. Mixed hyperlipidemia  Stable.  Will hold off on statin at this time.  Continue healthy lifestyle, continue fish oil OTC, will recheck lipid panel in 3 months.       Discussed with patient possible alternative diagnoses, pt is to take all medications as prescribed. If symptoms persist FU w/PCP, if symptoms worsen go to emergency room. If experiencing any side effects from prescribed medications reports to the office immediately or go to emergency room.  Reviewed indication, dosage, usage and potential adverse effects of prescribed medications. Reviewed risks and benefits of treatment plan. Patient verbalizes understanding of all instruction and verbally agrees to plan.    Return if symptoms worsen or fail to improve.

## 2020-07-31 NOTE — ASSESSMENT & PLAN NOTE
Chronic problem. Well controlled on Olmesartan 5 mg daily, tolerating well. BP today is 136/80. She denies CP, dyspnea, palpitations, changes in vision, peripheral edema. Her home devise did not calibrate to match the office device.

## 2020-07-31 NOTE — ASSESSMENT & PLAN NOTE
Chronic problem.  Was started on rosuvastatin 10 mg last visit.  This significantly improved her cholesterol, however patient experienced myalgia.  She temporarily stopped medication, she continuing healthy lifestyle and taking fish oil.  She denies CP, dyspnea, dizziness, peripheral edema.  We will plan to recheck cholesterol panel and assess the need for statin.

## 2020-10-19 ENCOUNTER — HOSPITAL ENCOUNTER (OUTPATIENT)
Dept: LAB | Facility: MEDICAL CENTER | Age: 70
End: 2020-10-19
Attending: NURSE PRACTITIONER
Payer: MEDICARE

## 2020-10-19 DIAGNOSIS — E78.2 MIXED HYPERLIPIDEMIA: ICD-10-CM

## 2020-10-19 DIAGNOSIS — R79.89 ELEVATED LFTS: ICD-10-CM

## 2020-10-19 LAB
ALBUMIN SERPL BCP-MCNC: 4.5 G/DL (ref 3.2–4.9)
ALBUMIN/GLOB SERPL: 1.4 G/DL
ALP SERPL-CCNC: 75 U/L (ref 30–99)
ALT SERPL-CCNC: 51 U/L (ref 2–50)
ANION GAP SERPL CALC-SCNC: 14 MMOL/L (ref 7–16)
AST SERPL-CCNC: 56 U/L (ref 12–45)
BILIRUB SERPL-MCNC: 0.5 MG/DL (ref 0.1–1.5)
BUN SERPL-MCNC: 22 MG/DL (ref 8–22)
CALCIUM SERPL-MCNC: 10.3 MG/DL (ref 8.5–10.5)
CHLORIDE SERPL-SCNC: 100 MMOL/L (ref 96–112)
CHOLEST SERPL-MCNC: 265 MG/DL (ref 100–199)
CO2 SERPL-SCNC: 27 MMOL/L (ref 20–33)
CREAT SERPL-MCNC: 0.49 MG/DL (ref 0.5–1.4)
FASTING STATUS PATIENT QL REPORTED: NORMAL
GLOBULIN SER CALC-MCNC: 3.3 G/DL (ref 1.9–3.5)
GLUCOSE SERPL-MCNC: 86 MG/DL (ref 65–99)
HDLC SERPL-MCNC: 81 MG/DL
LDLC SERPL CALC-MCNC: 135 MG/DL
POTASSIUM SERPL-SCNC: 5.1 MMOL/L (ref 3.6–5.5)
PROT SERPL-MCNC: 7.8 G/DL (ref 6–8.2)
SODIUM SERPL-SCNC: 141 MMOL/L (ref 135–145)
TRIGL SERPL-MCNC: 246 MG/DL (ref 0–149)

## 2020-10-19 PROCEDURE — 80061 LIPID PANEL: CPT

## 2020-10-19 PROCEDURE — 80053 COMPREHEN METABOLIC PANEL: CPT

## 2020-10-19 PROCEDURE — 36415 COLL VENOUS BLD VENIPUNCTURE: CPT

## 2020-11-04 ENCOUNTER — OFFICE VISIT (OUTPATIENT)
Dept: MEDICAL GROUP | Facility: PHYSICIAN GROUP | Age: 70
End: 2020-11-04
Payer: MEDICARE

## 2020-11-04 VITALS
SYSTOLIC BLOOD PRESSURE: 130 MMHG | HEART RATE: 90 BPM | DIASTOLIC BLOOD PRESSURE: 80 MMHG | HEIGHT: 63 IN | TEMPERATURE: 97.8 F | RESPIRATION RATE: 12 BRPM | WEIGHT: 150 LBS | OXYGEN SATURATION: 93 % | BODY MASS INDEX: 26.58 KG/M2

## 2020-11-04 DIAGNOSIS — E78.2 MIXED HYPERLIPIDEMIA: ICD-10-CM

## 2020-11-04 DIAGNOSIS — R79.89 ELEVATED LFTS: ICD-10-CM

## 2020-11-04 DIAGNOSIS — R94.4 DECREASED CREATININE CLEARANCE: ICD-10-CM

## 2020-11-04 DIAGNOSIS — Z23 NEED FOR VACCINATION: ICD-10-CM

## 2020-11-04 DIAGNOSIS — G47.00 INSOMNIA, UNSPECIFIED TYPE: ICD-10-CM

## 2020-11-04 PROCEDURE — 99214 OFFICE O/P EST MOD 30 MIN: CPT | Mod: 25 | Performed by: NURSE PRACTITIONER

## 2020-11-04 PROCEDURE — G0008 ADMIN INFLUENZA VIRUS VAC: HCPCS | Performed by: NURSE PRACTITIONER

## 2020-11-04 PROCEDURE — 90750 HZV VACC RECOMBINANT IM: CPT | Performed by: NURSE PRACTITIONER

## 2020-11-04 PROCEDURE — 90662 IIV NO PRSV INCREASED AG IM: CPT | Performed by: NURSE PRACTITIONER

## 2020-11-04 PROCEDURE — 90472 IMMUNIZATION ADMIN EACH ADD: CPT | Performed by: NURSE PRACTITIONER

## 2020-11-04 RX ORDER — EZETIMIBE 10 MG/1
10 TABLET ORAL DAILY
Qty: 30 TAB | Refills: 4 | Status: SHIPPED | OUTPATIENT
Start: 2020-11-04 | End: 2020-11-05

## 2020-11-04 SDOH — HEALTH STABILITY: MENTAL HEALTH: HOW OFTEN DO YOU HAVE A DRINK CONTAINING ALCOHOL?: MONTHLY OR LESS

## 2020-11-04 SDOH — HEALTH STABILITY: MENTAL HEALTH: HOW MANY STANDARD DRINKS CONTAINING ALCOHOL DO YOU HAVE ON A TYPICAL DAY?: 1 OR 2

## 2020-11-04 ASSESSMENT — FIBROSIS 4 INDEX: FIB4 SCORE: 2.19

## 2020-11-04 NOTE — PROGRESS NOTES
Chief Complaint   Patient presents with   • Follow-Up   • Lab Results       HISTORY OF PRESENT ILLNESS: Patient is a 70 y.o. female, established patient who presents today to discuss medical problems as listed below:    Health Maintenance:  COMPLETED     Mixed hyperlipidemia  Results for KARY ALANIZ (MRN [id number]) as of 11/4/2020 12:27   Ref. Range 10/19/2020 09:39   Cholesterol,Tot Latest Ref Range: 100 - 199 mg/dL 265 (H)   Triglycerides Latest Ref Range: 0 - 149 mg/dL 246 (H)   HDL Latest Ref Range: >=40 mg/dL 81   LDL Latest Ref Range: <100 mg/dL 135 (H)   Elevated total triglycerides and LDL compensated HDL.  Did not tolerate Crestor for myalgia and elevated liver enzymes.  He is currently taking fish oil 2000 mg nightly.    Elevated LFTs  Results for KARY ALANIZ (MRN [id number]) as of 11/4/2020 12:27   Ref. Range 10/19/2020 09:39   AST(SGOT) Latest Ref Range: 12 - 45 U/L 56 (H)   ALT(SGPT) Latest Ref Range: 2 - 50 U/L 51 (H)   LFTs are improving per recent labs.  This is most likely induced by statins.    Decreased creatinine clearance  Results for KARY ALANIZ (MRN [id number]) as of 11/4/2020 12:27   Ref. Range 10/19/2020 09:39   Creatinine Latest Ref Range: 0.50 - 1.40 mg/dL 0.49 (L)       Insomnia  New problem.  Patient reports insomnia in the last year, becoming worse.  She is waking up in the middle of the night and has difficulty falling back asleep.  She denies history of smoking or EtOH.  She does admit to having a lot of thoughts and anxiety, however anxiety is well managed with lifestyle modifications.      Patient Active Problem List    Diagnosis Date Noted   • Decreased creatinine clearance 11/04/2020   • Insomnia 11/04/2020   • Elevated LFTs 07/17/2020   • Eczema 07/17/2020   • Mild asthma without complication 07/17/2020   • Essential hypertension 02/21/2020   • Mixed hyperlipidemia 01/24/2020   • Vitamin D deficiency 01/24/2020   • Encounter to establish care  2020   • Intermittent asthma without complication 2020   • Vision abnormalities 2020        Allergies: Lisinopril    Current Outpatient Medications   Medication Sig Dispense Refill   • olmesartan (BENICAR) 5 MG tablet Take 1 Tab by mouth every day. 90 Tab 1   • albuterol 108 (90 Base) MCG/ACT Aero Soln inhalation aerosol INHALE 2 PUFFS BY MOUTH EVERY 6 HOURS AS NEEDED FOR SHORTNESS OF BREATH 25.5 g 2   • nystatin/triamcinolone (MYCOLOG) 621235-5.1 UNIT/GM-% Cream As 1 g 5   • montelukast (SINGULAIR) 10 MG Tab TAKE 1 TABLET BY MOUTH EVERY DAY 90 Tab 1   • vitamin D, Ergocalciferol, (DRISDOL) 1.25 MG (33454 UT) Cap capsule TAKE 1 CAPSULE BY MOUTH EVERY 7 DAYS 12 Cap 1   • Omega-3 Fatty Acids (FISH OIL) 1000 MG Cap capsule Take 1,000 mg by mouth 2 Times a Day.     • WIXELA INHUB 250-50 MCG/DOSE AEROSOL POWDER, BREATH ACTIVATED      • rosuvastatin (CRESTOR) 10 MG Tab TAKE 1 TABLET BY MOUTH EVERY EVENING (Patient taking differently: 5 mg.) 60 Tab 0   • fluticasone-salmeterol (ADVAIR DISKUS) 250-50 MCG/DOSE AEROSOL POWDER, BREATH ACTIVATED Inhale 1 Puff by mouth every 12 hours. (Patient not taking: Reported on 2020) 1 Inhaler 3     No current facility-administered medications for this visit.        Social History     Tobacco Use   • Smoking status: Former Smoker     Packs/day: 0.25     Years: 8.00     Pack years: 2.00     Quit date: 1981     Years since quittin.8   • Smokeless tobacco: Never Used   Substance Use Topics   • Alcohol use: Yes     Frequency: Monthly or less     Drinks per session: 1 or 2     Comment: occasionally   • Drug use: Never     Social History     Social History Narrative   • Not on file       Family History   Problem Relation Age of Onset   • Cancer Father 62        pancrease ca       Allergies, past medical history, past surgical history, family history, social history reviewed and updated.    Review of Systems:     - Constitutional: Negative for fever, chills,  "unexpected weight change, and fatigue/generalized weakness.     - Respiratory: Negative for cough, sputum production, chest congestion, dyspnea, wheezing, and crackles.      - Cardiovascular: Negative for chest pain, palpitations, orthopnea, and bilateral lower extremity edema.    - Psychiatric/Behavioral: Negative for depression, suicidal/homicidal ideation and memory loss.      All other systems reviewed and are negative    Exam:    /80 (BP Location: Left arm, Patient Position: Sitting, BP Cuff Size: Adult)   Pulse 90   Temp 36.6 °C (97.8 °F) (Temporal)   Resp 12   Ht 1.6 m (5' 3\")   Wt 68 kg (150 lb)   SpO2 93%   BMI 26.57 kg/m²  Body mass index is 26.57 kg/m².    Physical Exam:  Constitutional: Well-developed and well-nourished. Not diaphoretic. No distress.   Cardiovascular: Regular rate and rhythm, S1 and S2 without murmur, rubs, or gallops.    Chest: Effort normal. Clear to auscultation throughout. No adventitious sounds.   Neurological: Alert and oriented x 3.   Psychiatric:  Behavior, mood, and affect are appropriate.  MA/nursing note and vitals reviewed.    LABS: 10/2020  results reviewed and discussed with the patient, questions answered.    Patient was seen for 25 minutes face to face of which > 50% of appointment time was spent on counseling and coordination of care regarding the above.     Assessment/Plan:  1. Mixed hyperlipidemia  Uncontrolled, stable.  She would like to stay off statins at this time.  Will initiate Zetia.  Also encouraged trial of OTC niacin flush free.  Will check labs in 3 months.  - Lipid Profile; Future  - Comp Metabolic Panel; Future    2. Need for vaccination  - Shingles Vaccine (Shingrix)  - INFLUENZA VACCINE, HIGH DOSE (65+ ONLY)    3. Elevated LFTs  Improving, will recheck in 3 months.    4. Decreased creatinine clearance  Mild decrease.  Recommend to stay well-hydrated.  We will recheck CMP in 3 months.    5. Insomnia, unspecified type  Uncontrolled, stable.  " Discussed nonpharmacological stress management and sleep hygiene.  Recommend trial of OTC magnesium nightly and 5 HTP.       Discussed with patient possible alternative diagnoses, pt is to take all medications as prescribed. If symptoms persist FU w/PCP, if symptoms worsen go to emergency room. If experiencing any side effects from prescribed medications reports to the office immediately or go to emergency room.  Reviewed indication, dosage, usage and potential adverse effects of prescribed medications. Reviewed risks and benefits of treatment plan. Patient verbalizes understanding of all instruction and verbally agrees to plan.    Return if symptoms worsen or fail to improve.

## 2020-11-04 NOTE — ASSESSMENT & PLAN NOTE
Results for KARY ALANIZ (MRN [id number]) as of 11/4/2020 12:27   Ref. Range 10/19/2020 09:39   Cholesterol,Tot Latest Ref Range: 100 - 199 mg/dL 265 (H)   Triglycerides Latest Ref Range: 0 - 149 mg/dL 246 (H)   HDL Latest Ref Range: >=40 mg/dL 81   LDL Latest Ref Range: <100 mg/dL 135 (H)   Elevated total triglycerides and LDL compensated HDL.  Did not tolerate Crestor for myalgia and elevated liver enzymes.  He is currently taking fish oil 2000 mg nightly.

## 2020-11-04 NOTE — ASSESSMENT & PLAN NOTE
Results for KARY ALANIZ (MRN [id number]) as of 11/4/2020 12:27   Ref. Range 10/19/2020 09:39   AST(SGOT) Latest Ref Range: 12 - 45 U/L 56 (H)   ALT(SGPT) Latest Ref Range: 2 - 50 U/L 51 (H)   LFTs are improving per recent labs.  This is most likely induced by statins.

## 2020-11-04 NOTE — ASSESSMENT & PLAN NOTE
New problem.  Patient reports insomnia in the last year, becoming worse.  She is waking up in the middle of the night and has difficulty falling back asleep.  She denies history of smoking or EtOH.  She does admit to having a lot of thoughts and anxiety, however anxiety is well managed with lifestyle modifications.

## 2020-11-04 NOTE — ASSESSMENT & PLAN NOTE
Results for KARY ALANIZ (MRN [id number]) as of 11/4/2020 12:27   Ref. Range 10/19/2020 09:39   Creatinine Latest Ref Range: 0.50 - 1.40 mg/dL 0.49 (L)

## 2020-11-05 RX ORDER — EZETIMIBE 10 MG/1
TABLET ORAL
Qty: 90 TAB | Refills: 0 | Status: SHIPPED | OUTPATIENT
Start: 2020-11-05 | End: 2021-02-02

## 2020-12-12 DIAGNOSIS — E55.9 VITAMIN D DEFICIENCY: ICD-10-CM

## 2020-12-15 RX ORDER — ERGOCALCIFEROL 1.25 MG/1
CAPSULE ORAL
Qty: 12 CAP | Refills: 1 | Status: SHIPPED | OUTPATIENT
Start: 2020-12-15 | End: 2021-06-28

## 2020-12-28 DIAGNOSIS — J45.20 INTERMITTENT ASTHMA WITHOUT COMPLICATION, UNSPECIFIED ASTHMA SEVERITY: ICD-10-CM

## 2020-12-29 RX ORDER — MONTELUKAST SODIUM 10 MG/1
TABLET ORAL
Qty: 90 TAB | Refills: 1 | Status: SHIPPED | OUTPATIENT
Start: 2020-12-29 | End: 2021-06-28

## 2021-01-15 DIAGNOSIS — Z23 NEED FOR VACCINATION: ICD-10-CM

## 2021-02-01 DIAGNOSIS — I10 ESSENTIAL HYPERTENSION: ICD-10-CM

## 2021-02-02 RX ORDER — EZETIMIBE 10 MG/1
TABLET ORAL
Qty: 90 TAB | Refills: 0 | Status: SHIPPED | OUTPATIENT
Start: 2021-02-02 | End: 2021-05-03 | Stop reason: SDUPTHER

## 2021-02-02 RX ORDER — OLMESARTAN MEDOXOMIL 5 MG/1
TABLET ORAL
Qty: 90 TAB | Refills: 1 | Status: SHIPPED | OUTPATIENT
Start: 2021-02-02 | End: 2021-07-28

## 2021-02-05 ENCOUNTER — HOSPITAL ENCOUNTER (OUTPATIENT)
Dept: LAB | Facility: MEDICAL CENTER | Age: 71
End: 2021-02-05
Attending: NURSE PRACTITIONER
Payer: MEDICARE

## 2021-02-05 ENCOUNTER — TELEPHONE (OUTPATIENT)
Dept: MEDICAL GROUP | Facility: PHYSICIAN GROUP | Age: 71
End: 2021-02-05

## 2021-02-05 DIAGNOSIS — E78.2 MIXED HYPERLIPIDEMIA: ICD-10-CM

## 2021-02-05 LAB
ALBUMIN SERPL BCP-MCNC: 4.1 G/DL (ref 3.2–4.9)
ALBUMIN/GLOB SERPL: 1.4 G/DL
ALP SERPL-CCNC: 74 U/L (ref 30–99)
ALT SERPL-CCNC: 41 U/L (ref 2–50)
ANION GAP SERPL CALC-SCNC: 12 MMOL/L (ref 7–16)
AST SERPL-CCNC: 43 U/L (ref 12–45)
BILIRUB SERPL-MCNC: 0.4 MG/DL (ref 0.1–1.5)
BUN SERPL-MCNC: 15 MG/DL (ref 8–22)
CALCIUM SERPL-MCNC: 9.4 MG/DL (ref 8.5–10.5)
CHLORIDE SERPL-SCNC: 102 MMOL/L (ref 96–112)
CHOLEST SERPL-MCNC: 216 MG/DL (ref 100–199)
CO2 SERPL-SCNC: 24 MMOL/L (ref 20–33)
CREAT SERPL-MCNC: 0.52 MG/DL (ref 0.5–1.4)
GLOBULIN SER CALC-MCNC: 3 G/DL (ref 1.9–3.5)
GLUCOSE SERPL-MCNC: 91 MG/DL (ref 65–99)
HDLC SERPL-MCNC: 73 MG/DL
LDLC SERPL CALC-MCNC: 112 MG/DL
POTASSIUM SERPL-SCNC: 4.4 MMOL/L (ref 3.6–5.5)
PROT SERPL-MCNC: 7.1 G/DL (ref 6–8.2)
SODIUM SERPL-SCNC: 138 MMOL/L (ref 135–145)
TRIGL SERPL-MCNC: 153 MG/DL (ref 0–149)

## 2021-02-05 PROCEDURE — 36415 COLL VENOUS BLD VENIPUNCTURE: CPT

## 2021-02-05 PROCEDURE — 80061 LIPID PANEL: CPT

## 2021-02-05 PROCEDURE — 80053 COMPREHEN METABOLIC PANEL: CPT

## 2021-02-06 NOTE — TELEPHONE ENCOUNTER
Phone Number Called: [phone]    Call outcome: Spoke to patient regarding message below.    Message: Called to inform patient lab results         ----- Message from IBAN Morales sent at 2/5/2021  4:33 PM PST -----  Please let patient know her labs look good.  Her recent cholesterol level significantly improved, however still slightly abnormal.

## 2021-05-03 NOTE — TELEPHONE ENCOUNTER
Received request via: Pharmacy    Was the patient seen in the last year in this department? Yes    Does the patient have an active prescription (recently filled or refills available) for medication(s) requested? No       Labs were reviewed 2/5

## 2021-05-04 RX ORDER — EZETIMIBE 10 MG/1
10 TABLET ORAL
Qty: 90 TABLET | Refills: 0 | Status: SHIPPED | OUTPATIENT
Start: 2021-05-04 | End: 2021-08-03

## 2021-06-14 DIAGNOSIS — J45.909 MILD ASTHMA WITHOUT COMPLICATION, UNSPECIFIED WHETHER PERSISTENT: ICD-10-CM

## 2021-06-15 RX ORDER — ALBUTEROL SULFATE 90 UG/1
2 AEROSOL, METERED RESPIRATORY (INHALATION) EVERY 6 HOURS PRN
Qty: 8.5 G | Refills: 3 | Status: SHIPPED | OUTPATIENT
Start: 2021-06-15

## 2021-06-28 DIAGNOSIS — J45.20 INTERMITTENT ASTHMA WITHOUT COMPLICATION, UNSPECIFIED ASTHMA SEVERITY: ICD-10-CM

## 2021-06-28 DIAGNOSIS — E55.9 VITAMIN D DEFICIENCY: ICD-10-CM

## 2021-06-29 RX ORDER — ERGOCALCIFEROL 1.25 MG/1
CAPSULE ORAL
Qty: 12 CAPSULE | Refills: 1 | Status: SHIPPED | OUTPATIENT
Start: 2021-06-29

## 2021-06-29 RX ORDER — MONTELUKAST SODIUM 10 MG/1
TABLET ORAL
Qty: 90 TABLET | Refills: 1 | Status: SHIPPED | OUTPATIENT
Start: 2021-06-29 | End: 2021-12-21

## 2021-07-28 DIAGNOSIS — I10 ESSENTIAL HYPERTENSION: ICD-10-CM

## 2021-08-03 RX ORDER — EZETIMIBE 10 MG/1
10 TABLET ORAL
Qty: 90 TABLET | Refills: 0 | Status: SHIPPED | OUTPATIENT
Start: 2021-08-03

## 2021-12-21 DIAGNOSIS — J45.20 INTERMITTENT ASTHMA WITHOUT COMPLICATION, UNSPECIFIED ASTHMA SEVERITY: ICD-10-CM

## 2021-12-21 RX ORDER — MONTELUKAST SODIUM 10 MG/1
TABLET ORAL
Qty: 90 TABLET | Refills: 1 | Status: SHIPPED | OUTPATIENT
Start: 2021-12-21

## 2021-12-21 NOTE — TELEPHONE ENCOUNTER
Received request via: Pharmacy    Was the patient seen in the last year in this department? No     Does the patient have an active prescription (recently filled or refills available) for medication(s) requested? No